# Patient Record
Sex: MALE | Race: WHITE
[De-identification: names, ages, dates, MRNs, and addresses within clinical notes are randomized per-mention and may not be internally consistent; named-entity substitution may affect disease eponyms.]

---

## 2023-04-05 PROBLEM — Z00.00 ENCOUNTER FOR PREVENTIVE HEALTH EXAMINATION: Status: ACTIVE | Noted: 2023-04-05

## 2023-04-06 ENCOUNTER — APPOINTMENT (OUTPATIENT)
Dept: ORTHOPEDIC SURGERY | Facility: CLINIC | Age: 63
End: 2023-04-06
Payer: MEDICAID

## 2023-04-06 VITALS — WEIGHT: 150 LBS | HEIGHT: 64 IN | BODY MASS INDEX: 25.61 KG/M2

## 2023-04-06 DIAGNOSIS — I10 ESSENTIAL (PRIMARY) HYPERTENSION: ICD-10-CM

## 2023-04-06 PROCEDURE — 99203 OFFICE O/P NEW LOW 30 MIN: CPT

## 2023-04-06 NOTE — DATA REVIEWED
[Outside X-rays] : outside x-rays [Right] : of the right [Hand] : hand [I reviewed the films/CD and agree] : I reviewed the films/CD and agree [FreeTextEntry1] : healed fx distal phalanx

## 2023-04-06 NOTE — ASSESSMENT
[FreeTextEntry1] : will get him started on ot aggressive range of motion and f/u 4 weeks. No splint immobilization- ellyn taping and motion encouraged

## 2023-04-06 NOTE — IMAGING
[de-identified] : right 5th finger- nail has been lost from 5th nailbed, with residual ecchymosis. He is stiff at the dip joint holding it in extension.
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
## 2023-05-18 ENCOUNTER — APPOINTMENT (OUTPATIENT)
Dept: ORTHOPEDIC SURGERY | Facility: CLINIC | Age: 63
End: 2023-05-18
Payer: MEDICAID

## 2023-05-18 VITALS — BODY MASS INDEX: 25.61 KG/M2 | HEIGHT: 64 IN | WEIGHT: 150 LBS

## 2023-05-18 PROCEDURE — 99213 OFFICE O/P EST LOW 20 MIN: CPT

## 2023-05-18 NOTE — PHYSICAL EXAM
[Right] : right hand [] : swelling [Distal Phalanx] : distal phalanx [5th] : 5th [DIP Joint] : DIP joint [FreeTextEntry3] : nail plate advancing little finger [FreeTextEntry9] : overall little finger ROM progressing nicely, still limited at DIP in flexion
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
## 2023-06-29 ENCOUNTER — APPOINTMENT (OUTPATIENT)
Dept: ORTHOPEDIC SURGERY | Facility: CLINIC | Age: 63
End: 2023-06-29
Payer: MEDICAID

## 2023-06-29 DIAGNOSIS — S62.666A NONDISPLACED FRACTURE OF DISTAL PHALANX OF RIGHT LITTLE FINGER, INITIAL ENCOUNTER FOR CLOSED FRACTURE: ICD-10-CM

## 2023-06-29 PROCEDURE — 99213 OFFICE O/P EST LOW 20 MIN: CPT

## 2023-06-29 NOTE — PHYSICAL EXAM
[Right] : right hand [Distal Phalanx] : distal phalanx [5th] : 5th [DIP Joint] : DIP joint [] : no swelling [FreeTextEntry3] : nail plate advancing little finger [FreeTextEntry9] : overall little finger ROM progressing nicely, able to get pulp down to palm, still limited at DIP in flexion
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
